# Patient Record
Sex: FEMALE | Race: BLACK OR AFRICAN AMERICAN | NOT HISPANIC OR LATINO | Employment: FULL TIME | URBAN - METROPOLITAN AREA
[De-identification: names, ages, dates, MRNs, and addresses within clinical notes are randomized per-mention and may not be internally consistent; named-entity substitution may affect disease eponyms.]

---

## 2023-08-18 LAB — URINE CULTURE: NORMAL

## 2024-03-25 ENCOUNTER — OFFICE VISIT (OUTPATIENT)
Dept: URGENT CARE | Facility: CLINIC | Age: 44
End: 2024-03-25
Payer: COMMERCIAL

## 2024-03-25 VITALS
OXYGEN SATURATION: 96 % | DIASTOLIC BLOOD PRESSURE: 68 MMHG | HEART RATE: 97 BPM | SYSTOLIC BLOOD PRESSURE: 99 MMHG | TEMPERATURE: 98.4 F | RESPIRATION RATE: 20 BRPM | BODY MASS INDEX: 36.02 KG/M2 | WEIGHT: 230 LBS

## 2024-03-25 DIAGNOSIS — R35.0 URINARY FREQUENCY: Primary | ICD-10-CM

## 2024-03-25 DIAGNOSIS — N39.0 ACUTE UTI: ICD-10-CM

## 2024-03-25 LAB
POC APPEARANCE, URINE: ABNORMAL
POC BILIRUBIN, URINE: ABNORMAL
POC BLOOD, URINE: NEGATIVE
POC COLOR, URINE: YELLOW
POC GLUCOSE, URINE: NEGATIVE MG/DL
POC KETONES, URINE: ABNORMAL MG/DL
POC LEUKOCYTES, URINE: NEGATIVE
POC NITRITE,URINE: POSITIVE
POC PH, URINE: 5.5 PH
POC PROTEIN, URINE: ABNORMAL MG/DL
POC SPECIFIC GRAVITY, URINE: >=1.03
POC UROBILINOGEN, URINE: 0.2 EU/DL

## 2024-03-25 PROCEDURE — 87086 URINE CULTURE/COLONY COUNT: CPT

## 2024-03-25 PROCEDURE — 99203 OFFICE O/P NEW LOW 30 MIN: CPT | Performed by: PHYSICIAN ASSISTANT

## 2024-03-25 PROCEDURE — 1036F TOBACCO NON-USER: CPT | Performed by: PHYSICIAN ASSISTANT

## 2024-03-25 PROCEDURE — 87186 SC STD MICRODIL/AGAR DIL: CPT

## 2024-03-25 PROCEDURE — 81002 URINALYSIS NONAUTO W/O SCOPE: CPT | Performed by: PHYSICIAN ASSISTANT

## 2024-03-25 RX ORDER — CIPROFLOXACIN 500 MG/1
500 TABLET ORAL 2 TIMES DAILY
Qty: 14 TABLET | Refills: 0 | Status: SHIPPED | OUTPATIENT
Start: 2024-03-25 | End: 2024-04-01

## 2024-03-25 RX ORDER — VALACYCLOVIR HYDROCHLORIDE 1 G/1
1000 TABLET, FILM COATED ORAL DAILY
COMMUNITY

## 2024-03-25 ASSESSMENT — ENCOUNTER SYMPTOMS
FREQUENCY: 1
DYSURIA: 1

## 2024-03-25 NOTE — PROGRESS NOTES
Subjective   Patient ID: Fani Warner is a 43 y.o. female.    Patient is a 43-year-old female who complains of urinary frequency and mild dysuria that she has been experiencing for the past 1 day.  Patient denies fever, chills, flank pain, dysuria, nausea or other symptoms.  Patient does have a history of urinary tract infections and states that she has been prescribed Cipro for same in the past which has worked well.      UTI  The following portions of the chart were reviewed this encounter and updated as appropriate:       Review of Systems   Genitourinary:  Positive for dysuria and frequency.   All other systems reviewed and are negative.  Objective   Physical Exam  Vitals and nursing note reviewed.   Constitutional:       Appearance: Normal appearance. She is normal weight.   HENT:      Head: Normocephalic.      Mouth/Throat:      Mouth: Mucous membranes are moist.      Pharynx: Oropharynx is clear.   Eyes:      Extraocular Movements: Extraocular movements intact.      Conjunctiva/sclera: Conjunctivae normal.      Pupils: Pupils are equal, round, and reactive to light.   Cardiovascular:      Pulses: Normal pulses.   Pulmonary:      Effort: Pulmonary effort is normal.      Breath sounds: Normal breath sounds.   Abdominal:      General: Abdomen is flat.      Palpations: Abdomen is soft.   Skin:     General: Skin is warm and dry.      Capillary Refill: Capillary refill takes less than 2 seconds.   Neurological:      General: No focal deficit present.      Mental Status: She is alert and oriented to person, place, and time.   Psychiatric:         Mood and Affect: Mood normal.         Behavior: Behavior normal.         Thought Content: Thought content normal.         Judgment: Judgment normal.     Assessment/Plan   Physical exam findings as noted above.  Urinalysis shows nitrite and urine culture was ordered.  Patient was provided with a prescription for Cipro 500 mg and advised that results of the urine culture  be available in 2 days.  Patient was advised that she will be contacted if there is a need to change her medication based on the sensitivity report.  Patient verbalizes clear understanding of the above instructions.    CLINICAL IMPRESSION:  Acute UTI    Diagnoses and all orders for this visit:  Urinary frequency  -     POCT UA (nonautomated) manually resulted  Acute UTI  -     Urine Culture  -     ciprofloxacin (Cipro) 500 mg tablet; Take 1 tablet (500 mg) by mouth 2 times a day for 7 days.      Patient disposition: Home

## 2024-03-28 ENCOUNTER — TELEPHONE (OUTPATIENT)
Dept: URGENT CARE | Facility: CLINIC | Age: 44
End: 2024-03-28
Payer: COMMERCIAL

## 2024-03-28 DIAGNOSIS — N30.00 ACUTE CYSTITIS WITHOUT HEMATURIA: Primary | ICD-10-CM

## 2024-03-28 LAB — BACTERIA UR CULT: ABNORMAL

## 2024-03-28 RX ORDER — SULFAMETHOXAZOLE AND TRIMETHOPRIM 800; 160 MG/1; MG/1
1 TABLET ORAL 2 TIMES DAILY
Qty: 10 TABLET | Refills: 0 | Status: SHIPPED | OUTPATIENT
Start: 2024-03-28 | End: 2024-04-02

## 2024-03-28 NOTE — TELEPHONE ENCOUNTER
Pt notified of antibiotic resistance to the cipro rx she was given at time of visit. Rx changed to bactrim DS bid x 5 days. Advised to stop cipro rx. Pcp follow up if not improving or worsening this week.

## 2024-11-04 ENCOUNTER — HOSPITAL ENCOUNTER (EMERGENCY)
Facility: HOSPITAL | Age: 44
Discharge: HOME | End: 2024-11-04
Payer: COMMERCIAL

## 2024-11-04 VITALS
TEMPERATURE: 98.6 F | HEIGHT: 67 IN | SYSTOLIC BLOOD PRESSURE: 120 MMHG | OXYGEN SATURATION: 96 % | DIASTOLIC BLOOD PRESSURE: 82 MMHG | RESPIRATION RATE: 16 BRPM | BODY MASS INDEX: 35.64 KG/M2 | WEIGHT: 227.07 LBS | HEART RATE: 69 BPM

## 2024-11-04 DIAGNOSIS — R51.9 ACUTE NONINTRACTABLE HEADACHE, UNSPECIFIED HEADACHE TYPE: Primary | ICD-10-CM

## 2024-11-04 DIAGNOSIS — R30.0 DYSURIA: ICD-10-CM

## 2024-11-04 DIAGNOSIS — J01.10 ACUTE FRONTAL SINUSITIS, RECURRENCE NOT SPECIFIED: ICD-10-CM

## 2024-11-04 LAB
APPEARANCE UR: CLEAR
BILIRUB UR STRIP.AUTO-MCNC: NEGATIVE MG/DL
COLOR UR: NORMAL
GLUCOSE UR STRIP.AUTO-MCNC: NORMAL MG/DL
KETONES UR STRIP.AUTO-MCNC: NEGATIVE MG/DL
LEUKOCYTE ESTERASE UR QL STRIP.AUTO: NEGATIVE
NITRITE UR QL STRIP.AUTO: NEGATIVE
PH UR STRIP.AUTO: 7 [PH]
PROT UR STRIP.AUTO-MCNC: NEGATIVE MG/DL
RBC # UR STRIP.AUTO: NEGATIVE /UL
SP GR UR STRIP.AUTO: 1.02
UROBILINOGEN UR STRIP.AUTO-MCNC: NORMAL MG/DL

## 2024-11-04 PROCEDURE — 2500000004 HC RX 250 GENERAL PHARMACY W/ HCPCS (ALT 636 FOR OP/ED): Performed by: PHYSICIAN ASSISTANT

## 2024-11-04 PROCEDURE — 2500000001 HC RX 250 WO HCPCS SELF ADMINISTERED DRUGS (ALT 637 FOR MEDICARE OP): Performed by: PHYSICIAN ASSISTANT

## 2024-11-04 PROCEDURE — 96372 THER/PROPH/DIAG INJ SC/IM: CPT | Performed by: PHYSICIAN ASSISTANT

## 2024-11-04 PROCEDURE — 99283 EMERGENCY DEPT VISIT LOW MDM: CPT | Performed by: PHYSICIAN ASSISTANT

## 2024-11-04 PROCEDURE — 81003 URINALYSIS AUTO W/O SCOPE: CPT | Performed by: PHYSICIAN ASSISTANT

## 2024-11-04 PROCEDURE — 2500000001 HC RX 250 WO HCPCS SELF ADMINISTERED DRUGS (ALT 637 FOR MEDICARE OP)

## 2024-11-04 RX ORDER — PROCHLORPERAZINE MALEATE 10 MG
10 TABLET ORAL ONCE
Status: COMPLETED | OUTPATIENT
Start: 2024-11-04 | End: 2024-11-04

## 2024-11-04 RX ORDER — DIPHENHYDRAMINE HCL 25 MG
25 TABLET ORAL ONCE
Status: COMPLETED | OUTPATIENT
Start: 2024-11-04 | End: 2024-11-04

## 2024-11-04 RX ORDER — METHYLPREDNISOLONE 4 MG/1
TABLET ORAL
Qty: 21 TABLET | Refills: 0 | Status: SHIPPED | OUTPATIENT
Start: 2024-11-04 | End: 2024-11-11

## 2024-11-04 RX ORDER — NAPROXEN 500 MG/1
500 TABLET ORAL
Qty: 14 TABLET | Refills: 0 | Status: SHIPPED | OUTPATIENT
Start: 2024-11-04 | End: 2024-11-11

## 2024-11-04 RX ORDER — ACETAMINOPHEN 325 MG/1
650 TABLET ORAL EVERY 6 HOURS PRN
Qty: 30 TABLET | Refills: 0 | Status: SHIPPED | OUTPATIENT
Start: 2024-11-04 | End: 2024-11-14

## 2024-11-04 RX ORDER — DOXYCYCLINE 100 MG/1
100 CAPSULE ORAL 2 TIMES DAILY
Qty: 20 CAPSULE | Refills: 0 | Status: SHIPPED | OUTPATIENT
Start: 2024-11-04 | End: 2024-11-14

## 2024-11-04 RX ORDER — PROCHLORPERAZINE EDISYLATE 5 MG/ML
10 INJECTION INTRAMUSCULAR; INTRAVENOUS EVERY 6 HOURS PRN
Status: DISCONTINUED | OUTPATIENT
Start: 2024-11-04 | End: 2024-11-04 | Stop reason: ALTCHOICE

## 2024-11-04 RX ORDER — KETOROLAC TROMETHAMINE 30 MG/ML
30 INJECTION, SOLUTION INTRAMUSCULAR; INTRAVENOUS ONCE
Status: COMPLETED | OUTPATIENT
Start: 2024-11-04 | End: 2024-11-04

## 2024-11-04 ASSESSMENT — COLUMBIA-SUICIDE SEVERITY RATING SCALE - C-SSRS
6. HAVE YOU EVER DONE ANYTHING, STARTED TO DO ANYTHING, OR PREPARED TO DO ANYTHING TO END YOUR LIFE?: NO
2. HAVE YOU ACTUALLY HAD ANY THOUGHTS OF KILLING YOURSELF?: NO
1. IN THE PAST MONTH, HAVE YOU WISHED YOU WERE DEAD OR WISHED YOU COULD GO TO SLEEP AND NOT WAKE UP?: NO

## 2024-11-04 ASSESSMENT — PAIN DESCRIPTION - ONSET: ONSET: AWAKENED FROM SLEEP

## 2024-11-04 ASSESSMENT — PAIN - FUNCTIONAL ASSESSMENT: PAIN_FUNCTIONAL_ASSESSMENT: 0-10

## 2024-11-04 ASSESSMENT — PAIN SCALES - GENERAL
PAINLEVEL_OUTOF10: 10 - WORST POSSIBLE PAIN
PAINLEVEL_OUTOF10: 0 - NO PAIN

## 2024-11-04 ASSESSMENT — PAIN DESCRIPTION - FREQUENCY: FREQUENCY: CONSTANT/CONTINUOUS

## 2024-11-04 ASSESSMENT — PAIN DESCRIPTION - PAIN TYPE: TYPE: ACUTE PAIN

## 2024-11-04 ASSESSMENT — PAIN DESCRIPTION - DESCRIPTORS: DESCRIPTORS: ACHING

## 2024-11-04 ASSESSMENT — PAIN DESCRIPTION - LOCATION: LOCATION: HEAD

## 2024-11-04 NOTE — ED PROVIDER NOTES
HPI   Chief Complaint   Patient presents with    Headache     Sinus pressure for last two weeks and possible uti       44-year-old female presented emergency department with a chief complaint of urinary frequency urgency burning with urination additionally complains of sinus congestion.  Sinus congestion has been for 2 weeks.  Urinary symptoms been for the last several days.  She denies abdominal pain denies flank pain.  Denies fevers.  Vital signs within normal limit.  Tried nothing for relief.  No other complaint.              Patient History   Past Medical History:   Diagnosis Date    Abnormal uterine and vaginal bleeding, unspecified 10/10/2019    Vaginal bleeding, abnormal    Candidiasis, unspecified 01/10/2019    Yeast infection    Encounter for screening for infections with a predominantly sexual mode of transmission 10/10/2019    Screen for STD (sexually transmitted disease)    Encounter for screening for other disorder 01/10/2019    Screening for blood or protein in urine    Hematoma of broad ligament     Broad ligament hematoma    Irregular menstruation, unspecified 2016    Irregular menstrual cycle    Other abnormal and inconclusive findings on diagnostic imaging of breast 2019    Abnormal finding on breast imaging    Personal history of other diseases of the female genital tract 2016    History of menorrhagia    Personal history of other diseases of the female genital tract 2014    History of dysmenorrhea    Personal history of other specified conditions 2017    History of pelvic hematoma    Personal history of other specified conditions 2019    History of urinary frequency    Personal history of other specified conditions 2019    History of urinary urgency    Unspecified abdominal pain 2019    Abdominal pain, recurrent     Past Surgical History:   Procedure Laterality Date     SECTION, CLASSIC  2014     Section    OTHER SURGICAL  HISTORY  12/28/2016    Supracervical Hysterectomy Laparoscopic     No family history on file.  Social History     Tobacco Use    Smoking status: Never    Smokeless tobacco: Never   Substance Use Topics    Alcohol use: Not on file    Drug use: Not on file       Physical Exam   ED Triage Vitals [11/04/24 1443]   Temperature Heart Rate Respirations BP   37 °C (98.6 °F) 69 16 120/82      Pulse Ox Temp Source Heart Rate Source Patient Position   96 % Oral Monitor Sitting      BP Location FiO2 (%)     Right arm --       Physical Exam  Vitals and nursing note reviewed.   Constitutional:       Appearance: She is well-developed.   HENT:      Head: Normocephalic.   Cardiovascular:      Rate and Rhythm: Normal rate and regular rhythm.   Pulmonary:      Effort: Pulmonary effort is normal.      Breath sounds: Normal breath sounds.   Musculoskeletal:         General: Normal range of motion.      Cervical back: Normal range of motion.   Skin:     General: Skin is warm.   Neurological:      Mental Status: She is alert and oriented to person, place, and time.      Cranial Nerves: No cranial nerve deficit or dysarthria.      Sensory: No sensory deficit.      Motor: No weakness.   Psychiatric:         Mood and Affect: Mood normal.           ED Course & MDM   Diagnoses as of 11/04/24 1708   Acute nonintractable headache, unspecified headache type   Acute frontal sinusitis, recurrence not specified   Dysuria                 No data recorded     [place] Coma Scale Score: 15 (11/04/24 1456 : Swati Best RN)                           Medical Decision Making  I have seen and evaluated this patient.  Physician available for consultation.  Vital signs have been reviewed.  All laboratory and diagnostic imaging is reviewed by myself and interpreted by myself unless otherwise stated.  Additionally imaging is interpreted by radiologist.    Urinalysis is negative.  Symptoms most consistent with sinus infection.  Placed on antibiotic for  coverage of dysuric symptoms in addition to sinus symptoms.  Released from emergent standpoint stable condition with outpatient follow-up.    Labs Reviewed  URINALYSIS WITH REFLEX CULTURE AND MICROSCOPIC - Normal     Color, Urine                                         Appearance, Urine             Clear                  Specific Gravity, Urine       1.024                  pH, Urine                     7.0                    Protein, Urine                NEGATIVE                Glucose, Urine                Normal                 Blood, Urine                  NEGATIVE                Ketones, Urine                NEGATIVE                Bilirubin, Urine              NEGATIVE                Urobilinogen, Urine           Normal                 Nitrite, Urine                NEGATIVE                Leukocyte Esterase, Urine     NEGATIVE             URINALYSIS WITH REFLEX CULTURE AND MICROSCOPIC       Narrative: The following orders were created for panel order Urinalysis with Reflex Culture and Microscopic.                Procedure                               Abnormality         Status                                   ---------                               -----------         ------                                   Urinalysis with Reflex C...[[id number]]  Normal              Final result                             Extra Urine Gray Tube[[id number]]                                                                                     Please view results for these tests on the individual orders.  EXTRA URINE GRAY TUBE  No orders to display  Medications  diphenhydrAMINE (Sominex) tablet 25 mg (25 mg oral Given 11/4/24 1606)  ketorolac (Toradol) injection 30 mg (30 mg intramuscular Given 11/4/24 1606)   prochlorperazine (Compazine) tablet 10 mg (10 mg oral Given 11/4/24 1606)  Discharge Medication List as of 11/4/2024  5:08 PM    START taking these medications    acetaminophen (Tylenol) 325 mg tablet  Take 2 tablets (650  mg) by mouth every 6 hours if needed for mild pain (1 - 3) for up to 10 days., Starting Mon 11/4/2024, Until Thu 11/14/2024 at 2359, Normal    doxycycline (Vibramycin) 100 mg capsule  Take 1 capsule (100 mg) by mouth 2 times a day for 10 days. Take with at least 8 ounces (large glass) of water, do not lie down for 30 minutes after, Starting Mon 11/4/2024, Until u 11/14/2024, Normal    methylPREDNISolone (Medrol Dospak) 4 mg tablets  Follow schedule on package instructions, Normal    naproxen (Naprosyn) 500 mg tablet  Take 1 tablet (500 mg) by mouth 2 times daily (morning and late afternoon) for 7 days., Starting Mon 11/4/2024, Until Mon 11/11/2024, Normal                  Procedure  Procedures     Kwabena Fernandez PA-C  11/08/24 3345

## 2024-11-14 ENCOUNTER — APPOINTMENT (OUTPATIENT)
Dept: RADIOLOGY | Facility: HOSPITAL | Age: 44
End: 2024-11-14
Payer: COMMERCIAL

## 2024-11-14 ENCOUNTER — HOSPITAL ENCOUNTER (EMERGENCY)
Facility: HOSPITAL | Age: 44
Discharge: HOME | End: 2024-11-14
Payer: COMMERCIAL

## 2024-11-14 VITALS
RESPIRATION RATE: 16 BRPM | WEIGHT: 245.81 LBS | TEMPERATURE: 98.1 F | HEIGHT: 67 IN | SYSTOLIC BLOOD PRESSURE: 129 MMHG | OXYGEN SATURATION: 97 % | HEART RATE: 68 BPM | DIASTOLIC BLOOD PRESSURE: 74 MMHG | BODY MASS INDEX: 38.58 KG/M2

## 2024-11-14 DIAGNOSIS — M71.21 SYNOVIAL CYST OF RIGHT POPLITEAL SPACE: ICD-10-CM

## 2024-11-14 DIAGNOSIS — M77.31 CALCANEAL SPUR OF RIGHT FOOT: ICD-10-CM

## 2024-11-14 DIAGNOSIS — M79.604 PAIN OF RIGHT LOWER EXTREMITY: Primary | ICD-10-CM

## 2024-11-14 PROCEDURE — 93971 EXTREMITY STUDY: CPT

## 2024-11-14 PROCEDURE — 73610 X-RAY EXAM OF ANKLE: CPT | Mod: RIGHT SIDE | Performed by: RADIOLOGY

## 2024-11-14 PROCEDURE — 73610 X-RAY EXAM OF ANKLE: CPT | Mod: RT

## 2024-11-14 PROCEDURE — 99284 EMERGENCY DEPT VISIT MOD MDM: CPT | Mod: 25

## 2024-11-14 PROCEDURE — 93971 EXTREMITY STUDY: CPT | Performed by: STUDENT IN AN ORGANIZED HEALTH CARE EDUCATION/TRAINING PROGRAM

## 2024-11-14 RX ORDER — KETOROLAC TROMETHAMINE 10 MG/1
10 TABLET, FILM COATED ORAL EVERY 8 HOURS PRN
Qty: 20 TABLET | Refills: 0 | Status: SHIPPED | OUTPATIENT
Start: 2024-11-14

## 2024-11-14 ASSESSMENT — COLUMBIA-SUICIDE SEVERITY RATING SCALE - C-SSRS
1. IN THE PAST MONTH, HAVE YOU WISHED YOU WERE DEAD OR WISHED YOU COULD GO TO SLEEP AND NOT WAKE UP?: NO
2. HAVE YOU ACTUALLY HAD ANY THOUGHTS OF KILLING YOURSELF?: NO
6. HAVE YOU EVER DONE ANYTHING, STARTED TO DO ANYTHING, OR PREPARED TO DO ANYTHING TO END YOUR LIFE?: NO

## 2024-11-14 ASSESSMENT — PAIN SCALES - GENERAL: PAINLEVEL_OUTOF10: 8

## 2024-11-14 ASSESSMENT — PAIN - FUNCTIONAL ASSESSMENT: PAIN_FUNCTIONAL_ASSESSMENT: 0-10

## 2024-11-14 NOTE — ED PROVIDER NOTES
HPI   Chief Complaint   Patient presents with    Foot Pain     Pt complains of of pain in her right heel that has been getting worse for a couple months.  States pain is now radiating up the back of her leg and causing pain in her knee.        HPI  See my MDM      Patient History   Past Medical History:   Diagnosis Date    Abnormal uterine and vaginal bleeding, unspecified 10/10/2019    Vaginal bleeding, abnormal    Candidiasis, unspecified 01/10/2019    Yeast infection    Encounter for screening for infections with a predominantly sexual mode of transmission 10/10/2019    Screen for STD (sexually transmitted disease)    Encounter for screening for other disorder 01/10/2019    Screening for blood or protein in urine    Hematoma of broad ligament     Broad ligament hematoma    Irregular menstruation, unspecified 2016    Irregular menstrual cycle    Other abnormal and inconclusive findings on diagnostic imaging of breast 2019    Abnormal finding on breast imaging    Personal history of other diseases of the female genital tract 2016    History of menorrhagia    Personal history of other diseases of the female genital tract 2014    History of dysmenorrhea    Personal history of other specified conditions 2017    History of pelvic hematoma    Personal history of other specified conditions 2019    History of urinary frequency    Personal history of other specified conditions 2019    History of urinary urgency    Unspecified abdominal pain 2019    Abdominal pain, recurrent     Past Surgical History:   Procedure Laterality Date     SECTION, CLASSIC  2014     Section    OTHER SURGICAL HISTORY  2016    Supracervical Hysterectomy Laparoscopic     No family history on file.  Social History     Tobacco Use    Smoking status: Never    Smokeless tobacco: Never   Substance Use Topics    Alcohol use: Not on file    Drug use: Not on file       Physical Exam    ED Triage Vitals [11/14/24 1158]   Temperature Heart Rate Respirations BP   36.7 °C (98.1 °F) 68 16 129/74      Pulse Ox Temp src Heart Rate Source Patient Position   97 % -- -- --      BP Location FiO2 (%)     -- --       Physical Exam  CONSTITUTIONAL: Vital signs reviewed as charted, well-developed and in no distress  Eyes: Extraocular muscles are intact. Pupils equal round and reactive to light. Conjunctiva are pink.    ENT: Mucous membranes are moist. Tongue in the midline. Pharynx was without erythema or exudates, uvula midline  LUNGS: Breath sounds equal and clear to auscultation. Good air exchange, no wheezes rales or retractions, pulse oximetry is charted.  HEART: Regular rate and rhythm without murmur thrill or rub, strong tones, auscultation is normal.  ABDOMEN: Soft and nontender without guarding rebound rigidity or mass. Bowel sounds are present and normal in all quadrants. There is no palpable masses or aneurysms identified. No hepatosplenomegaly, normal abdominal exam.  Neuro: The patient is awake, alert and oriented ×3. Moving all 4 extremities and answering questions appropriately.   MUSCULOSKELETAL: Exam the right lower extremity shows mild edema surrounding the Achilles tendon, overlying the fifth metatarsal.  Tenderness palpation of the entire back of the leg.  Motor sensation pulses are intact distally.  There is no laxity or divots noted in palpation.  Cap refill less than 2 seconds.  PSYCH: Awake alert oriented, normal mood and affect.  Skin:  Dry, normal color, warm to the touch, no rash present.        ED Course & MDM   Diagnoses as of 11/14/24 1328   Pain of right lower extremity   Synovial cyst of right popliteal space   Calcaneal spur of right foot                 No data recorded     [place] Coma Scale Score: 15 (11/14/24 1200 : Britni Garland RN)                           Medical Decision Making  History obtained from: patient    Vital signs, nursing notes, current medications, past  medical history, Surgical history, allergies, social history, family History were reviewed.         HPI:  Patient 44-year-old female presenting emergency room today for evaluation of right leg pain into the foot.  States it all started to heal several months ago and is now progressively worsened over the last couple of days been having pain up the back of the leg to the knee.  Denies trauma.  States she does work a job where she is on her feet a lot.  Thinks may be progressing.  No fevers chills or night sweats.  No nausea vomiting diarrhea.  Denies dizziness, chest pain, shortness of breath or abdominal pain.      10 point ROS was reviewed and negative except Noted above in HPI.  DDX: as listed above          MDM Summary/considerations:  Labs Reviewed - No data to display  Lower extremity venous duplex right   Final Result   No DVT of the right lower extremity.        Popliteal cyst, 3.5 x 0.6 x 2.6 cm.        MACRO:   None        Signed by: Deonte Rojas 11/14/2024 1:11 PM   Dictation workstation:   [id number]      XR ankle right 3+ views   Final Result   No evidence of fracture. Hindfoot spurring noted.        MACRO:   None.        Signed by: Argelia Cueva 11/14/2024 12:55 PM   Dictation workstation:   [id number]        Medications - No data to display  Discharge Medication List as of 11/14/2024  1:23 PM        I estimate there is LOW risk for COMPARTMENT SYNDROME, DEEP VENOUS THROMBOSIS, SEPTIC ARTHRITIS, TENDON OR NEUROVASCULAR INJURY, thus I consider the discharge disposition reasonable. We have discussed the diagnosis and risks, and we agree with discharging home to follow-up with their primary doctor or the referral orthopedist. We also discussed returning to the Emergency Department immediately if new or worsening symptoms occur. We have discussed the symptoms which aremost concerning (e.g., changing or worsening pain, numbness, weakness) that necessitates immediate return.    Ultrasound shows no  evidence of embolism but does show a Baker's cyst posterior aspect of the knee.  X-ray does show some calcaneal spurring but otherwise unremarkable.  Discussed symptomatic treatment including anti-inflammatories, rest ice compression elevation.  Was given orthopedic referral.  Was discharged home stable condition.    All of the patient's questions were answered to the best of my ability.  Patient states understanding that they have been screened for an emergency today and we have not found any etiology of symptoms that requires emergent treatment or admission to the hospital at this point. They understand that they have not had definitive care day and require follow-up for treatment of their condition. They also state understanding that they may have an emergent condition that may potentially have not of detected at this visit and they must return to the emergency department if they develop any worsening of symptoms or new complaints.      I have evaluated this patient, my supervising physician was available for consultation.              Critical Care: Not warranted at this time        This chart was completed using voice recognition transcription software. Please excuse any errors of transcription including grammatical, punctuation, syntax and spelling errors.  Please contact me with any questions regarding this chart.    Procedure  Procedures     eMng [place], [id number]  11/14/24 0728

## 2024-12-18 ENCOUNTER — OFFICE VISIT (OUTPATIENT)
Dept: URGENT CARE | Age: 44
End: 2024-12-18
Payer: COMMERCIAL

## 2024-12-18 VITALS
WEIGHT: 240 LBS | TEMPERATURE: 98.3 F | HEIGHT: 67 IN | BODY MASS INDEX: 37.67 KG/M2 | DIASTOLIC BLOOD PRESSURE: 66 MMHG | HEART RATE: 75 BPM | OXYGEN SATURATION: 99 % | SYSTOLIC BLOOD PRESSURE: 111 MMHG

## 2024-12-18 DIAGNOSIS — N30.01 ACUTE CYSTITIS WITH HEMATURIA: Primary | ICD-10-CM

## 2024-12-18 DIAGNOSIS — R39.15 URGENCY OF URINATION: ICD-10-CM

## 2024-12-18 LAB
POC APPEARANCE, URINE: ABNORMAL
POC BILIRUBIN, URINE: NEGATIVE
POC BLOOD, URINE: ABNORMAL
POC COLOR, URINE: YELLOW
POC GLUCOSE, URINE: NEGATIVE MG/DL
POC KETONES, URINE: NEGATIVE MG/DL
POC LEUKOCYTES, URINE: ABNORMAL
POC NITRITE,URINE: NEGATIVE
POC PH, URINE: 6.5 PH
POC PROTEIN, URINE: NEGATIVE MG/DL
POC SPECIFIC GRAVITY, URINE: 1.01
POC UROBILINOGEN, URINE: 0.2 EU/DL

## 2024-12-18 PROCEDURE — 3008F BODY MASS INDEX DOCD: CPT | Performed by: NURSE PRACTITIONER

## 2024-12-18 PROCEDURE — 99213 OFFICE O/P EST LOW 20 MIN: CPT | Performed by: NURSE PRACTITIONER

## 2024-12-18 PROCEDURE — 87186 SC STD MICRODIL/AGAR DIL: CPT

## 2024-12-18 PROCEDURE — 87086 URINE CULTURE/COLONY COUNT: CPT

## 2024-12-18 PROCEDURE — 81003 URINALYSIS AUTO W/O SCOPE: CPT | Performed by: NURSE PRACTITIONER

## 2024-12-18 RX ORDER — NITROFURANTOIN 25; 75 MG/1; MG/1
100 CAPSULE ORAL 2 TIMES DAILY
Qty: 10 CAPSULE | Refills: 0 | Status: SHIPPED | OUTPATIENT
Start: 2024-12-18 | End: 2024-12-23

## 2024-12-18 ASSESSMENT — ENCOUNTER SYMPTOMS
NAUSEA: 0
DIFFICULTY URINATING: 0
EYE PAIN: 0
MYALGIAS: 0
ABDOMINAL PAIN: 0
SINUS PRESSURE: 0
SHORTNESS OF BREATH: 0
CHILLS: 0
VOMITING: 0
FREQUENCY: 1
DYSURIA: 1
APPETITE CHANGE: 0
FATIGUE: 0
SINUS PAIN: 0
HEADACHES: 0
CONSTIPATION: 0
SORE THROAT: 0
ARTHRALGIAS: 0
BACK PAIN: 0
COUGH: 0
TROUBLE SWALLOWING: 0
DIARRHEA: 0
DIZZINESS: 0
NECK PAIN: 0
FLANK PAIN: 0
FEVER: 0
HEMATURIA: 0
CHEST TIGHTNESS: 0
WOUND: 0
PALPITATIONS: 0
RHINORRHEA: 0

## 2024-12-18 ASSESSMENT — PAIN SCALES - GENERAL: PAINLEVEL_OUTOF10: 0-NO PAIN

## 2024-12-18 ASSESSMENT — VISUAL ACUITY: OU: 1

## 2024-12-18 NOTE — PROGRESS NOTES
Subjective   Patient ID: Fani Warner is a 44 y.o. female. They present today with a chief complaint of UTI (Patient c/o urgency, and frequency ).    History of Present Illness  The patient is a 43yo female who presents with urinary frequency, urgency, and pain x 2 days. The patient states that her last UTI was last month.      UTI  Associated symptoms: no abdominal pain, no chest pain, no congestion, no cough, no diarrhea, no ear pain, no fatigue, no fever, no headaches, no myalgias, no nausea, no rash, no rhinorrhea, no shortness of breath, no sore throat and no vomiting        Past Medical History  Allergies as of 12/18/2024 - Reviewed 12/18/2024   Allergen Reaction Noted    Penicillins Hives and Rash 09/30/2010       (Not in a hospital admission)       Past Medical History:   Diagnosis Date    Abnormal uterine and vaginal bleeding, unspecified 10/10/2019    Vaginal bleeding, abnormal    Candidiasis, unspecified 01/10/2019    Yeast infection    Encounter for screening for infections with a predominantly sexual mode of transmission 10/10/2019    Screen for STD (sexually transmitted disease)    Encounter for screening for other disorder 01/10/2019    Screening for blood or protein in urine    Hematoma of broad ligament     Broad ligament hematoma    Irregular menstruation, unspecified 09/30/2016    Irregular menstrual cycle    Other abnormal and inconclusive findings on diagnostic imaging of breast 04/12/2019    Abnormal finding on breast imaging    Personal history of other diseases of the female genital tract 12/06/2016    History of menorrhagia    Personal history of other diseases of the female genital tract 08/25/2014    History of dysmenorrhea    Personal history of other specified conditions 03/09/2017    History of pelvic hematoma    Personal history of other specified conditions 07/01/2019    History of urinary frequency    Personal history of other specified conditions 02/07/2019    History of urinary  "urgency    Unspecified abdominal pain 2019    Abdominal pain, recurrent       Past Surgical History:   Procedure Laterality Date     SECTION, CLASSIC  2014     Section    OTHER SURGICAL HISTORY  2016    Supracervical Hysterectomy Laparoscopic        reports that she has never smoked. She has never used smokeless tobacco.    Review of Systems  Review of Systems   Constitutional:  Negative for appetite change, chills, fatigue and fever.   HENT:  Negative for congestion, dental problem, ear pain, hearing loss, postnasal drip, rhinorrhea, sinus pressure, sinus pain, sneezing, sore throat and trouble swallowing.    Eyes:  Negative for pain.   Respiratory:  Negative for cough, chest tightness and shortness of breath.    Cardiovascular:  Negative for chest pain and palpitations.   Gastrointestinal:  Negative for abdominal pain, constipation, diarrhea, nausea and vomiting.   Genitourinary:  Positive for dysuria, frequency and urgency. Negative for decreased urine volume, difficulty urinating, enuresis, flank pain, genital sores, hematuria, menstrual problem, pelvic pain, vaginal bleeding, vaginal discharge and vaginal pain.   Musculoskeletal:  Negative for arthralgias, back pain, gait problem, myalgias and neck pain.   Skin:  Negative for rash and wound.   Neurological:  Negative for dizziness and headaches.   Psychiatric/Behavioral:  Negative for self-injury and suicidal ideas.                                   Objective    Vitals:    24 1419   BP: 111/66   Pulse: 75   Temp: 36.8 °C (98.3 °F)   SpO2: 99%   Weight: 109 kg (240 lb)   Height: 1.702 m (5' 7\")     No LMP recorded.    Physical Exam  Vitals reviewed.   Constitutional:       General: She is awake. She is not in acute distress.     Appearance: Normal appearance. She is well-developed, well-groomed and normal weight. She is not ill-appearing.   HENT:      Head: Normocephalic and atraumatic.      Right Ear: Hearing and external " ear normal.      Left Ear: Hearing and external ear normal.      Nose: Nose normal. No nasal deformity or signs of injury.      Mouth/Throat:      Lips: Pink.   Eyes:      General: Lids are normal. Vision grossly intact.   Cardiovascular:      Rate and Rhythm: Normal rate and regular rhythm.      Heart sounds: Normal heart sounds, S1 normal and S2 normal. Heart sounds not distant. No murmur heard.  Pulmonary:      Effort: Pulmonary effort is normal. No tachypnea, bradypnea, accessory muscle usage, prolonged expiration, respiratory distress or retractions.      Breath sounds: Normal breath sounds and air entry. No stridor, decreased air movement or transmitted upper airway sounds. No decreased breath sounds.   Chest:      Chest wall: No deformity.   Abdominal:      General: Abdomen is flat. Bowel sounds are normal.      Palpations: Abdomen is soft.      Tenderness: There is no right CVA tenderness or left CVA tenderness.   Skin:     General: Skin is warm and dry.      Capillary Refill: Capillary refill takes less than 2 seconds.   Neurological:      General: No focal deficit present.      Mental Status: She is alert.      Gait: Gait is intact.   Psychiatric:         Attention and Perception: Attention and perception normal.         Mood and Affect: Mood and affect normal.         Speech: Speech normal.         Behavior: Behavior normal. Behavior is cooperative.         Procedures    Point of Care Test & Imaging Results from this visit  Results for orders placed or performed in visit on 12/18/24   POCT UA Automated manually resulted   Result Value Ref Range    POC Color, Urine Yellow Straw, Yellow, Light-Yellow    POC Appearance, Urine Cloudy (A) Clear    POC Glucose, Urine NEGATIVE NEGATIVE mg/dl    POC Bilirubin, Urine NEGATIVE NEGATIVE    POC Ketones, Urine NEGATIVE NEGATIVE mg/dl    POC Specific Gravity, Urine 1.015 1.005 - 1.035    POC Blood, Urine TRACE-Intact (A) NEGATIVE    POC PH, Urine 6.5 No Reference Range  Established PH    POC Protein, Urine NEGATIVE NEGATIVE, 30 (1+) mg/dl    POC Urobilinogen, Urine 0.2 0.2, 1.0 EU/DL    Poc Nitrite, Urine NEGATIVE NEGATIVE    POC Leukocytes, Urine MODERATE (2+) (A) NEGATIVE      No results found.    Diagnostic study results (if any) were reviewed by HAMILTON Lawrence.    Assessment/Plan   Allergies, medications, history, and pertinent labs/EKGs/Imaging reviewed by HAMILTON Lawrence.     Medical Decision Making  Urinalysis indicative of a UTI. Will treat with macrobid bid x 5 day. Advised patient to increase clear fluid intake. Continue with probiotic. Follow up with pcp or urology for reoccurrence.     Risks, benefits, and alternatives of the medications and treatment plan prescribed today were discussed, and patient expressed understanding. Plan follow up as discussed or as needed if any worsening symptoms or change in condition. Reinforced red flags including (but not limited to): severe or worsening abdominal pain; difficulty swallowing; stiff neck; shortness of breath; coughing or vomiting blood; chest pain; and new or increased fever are indications to go to the Emergency Department.    The patient voices understanding of all medications. No barriers to adherence. Patient is taking all medications as prescribed and tolerating well. For any new medications, the patient was instructed of directions for and consequences of not taking medication and they were informed about the potential side effects and drug interactions. The after-visit summary was given to the patient and care instructions were reviewed with the patient. All questions were answered and the patient verbalized understanding of the plan of care for today.    Orders and Diagnoses  Diagnoses and all orders for this visit:  Acute cystitis with hematuria  -     nitrofurantoin, macrocrystal-monohydrate, (Macrobid) 100 mg capsule; Take 1 capsule (100 mg) by mouth 2 times a day for 5  days.  Urgency of urination  -     POCT UA Automated manually resulted  -     Urine Culture      Medical Admin Record      Patient disposition: Home    Electronically signed by HAMILTON Lawrence  2:30 PM

## 2024-12-21 LAB — BACTERIA UR CULT: ABNORMAL

## 2024-12-22 ENCOUNTER — TELEPHONE (OUTPATIENT)
Dept: URGENT CARE | Age: 44
End: 2024-12-22

## 2024-12-22 DIAGNOSIS — N30.00 ACUTE CYSTITIS WITHOUT HEMATURIA: Primary | ICD-10-CM

## 2024-12-22 RX ORDER — SULFAMETHOXAZOLE AND TRIMETHOPRIM 800; 160 MG/1; MG/1
1 TABLET ORAL 2 TIMES DAILY
Qty: 10 TABLET | Refills: 0 | Status: SHIPPED | OUTPATIENT
Start: 2024-12-22 | End: 2024-12-27

## 2024-12-22 NOTE — TELEPHONE ENCOUNTER
Pt urine culture showed resistance to macrobid. Called pt and left VM to call RUBENS garcia. Order for bactrim sent to pharmacy.

## 2024-12-26 ENCOUNTER — HOSPITAL ENCOUNTER (OUTPATIENT)
Dept: RADIOLOGY | Facility: HOSPITAL | Age: 44
Discharge: HOME | End: 2024-12-26
Payer: COMMERCIAL

## 2024-12-26 DIAGNOSIS — S83.271A COMPLEX TEAR OF LATERAL MENISCUS, CURRENT INJURY, RIGHT KNEE, INITIAL ENCOUNTER: ICD-10-CM

## 2024-12-26 PROCEDURE — 73721 MRI JNT OF LWR EXTRE W/O DYE: CPT | Mod: RIGHT SIDE | Performed by: RADIOLOGY

## 2024-12-26 PROCEDURE — 73721 MRI JNT OF LWR EXTRE W/O DYE: CPT | Mod: RT

## 2025-06-22 ENCOUNTER — OFFICE VISIT (OUTPATIENT)
Dept: URGENT CARE | Age: 45
End: 2025-06-22
Payer: COMMERCIAL

## 2025-06-22 VITALS
WEIGHT: 249 LBS | TEMPERATURE: 98 F | SYSTOLIC BLOOD PRESSURE: 109 MMHG | HEART RATE: 82 BPM | BODY MASS INDEX: 39.08 KG/M2 | HEIGHT: 67 IN | DIASTOLIC BLOOD PRESSURE: 75 MMHG | RESPIRATION RATE: 20 BRPM | OXYGEN SATURATION: 96 %

## 2025-06-22 DIAGNOSIS — R30.0 BURNING WITH URINATION: ICD-10-CM

## 2025-06-22 DIAGNOSIS — N30.01 ACUTE CYSTITIS WITH HEMATURIA: Primary | ICD-10-CM

## 2025-06-22 LAB
POC APPEARANCE, URINE: ABNORMAL
POC BILIRUBIN, URINE: NEGATIVE
POC BLOOD, URINE: NEGATIVE
POC COLOR, URINE: YELLOW
POC GLUCOSE, URINE: NEGATIVE MG/DL
POC KETONES, URINE: NEGATIVE MG/DL
POC LEUKOCYTES, URINE: ABNORMAL
POC NITRITE,URINE: POSITIVE
POC PH, URINE: 6 PH
POC PROTEIN, URINE: NEGATIVE MG/DL
POC SPECIFIC GRAVITY, URINE: 1.01
POC UROBILINOGEN, URINE: 0.2 EU/DL
PREGNANCY TEST URINE, POC: NEGATIVE

## 2025-06-22 RX ORDER — SULFAMETHOXAZOLE AND TRIMETHOPRIM 800; 160 MG/1; MG/1
1 TABLET ORAL 2 TIMES DAILY
Qty: 14 TABLET | Refills: 0 | Status: SHIPPED | OUTPATIENT
Start: 2025-06-22

## 2025-06-22 RX ORDER — PHENAZOPYRIDINE HYDROCHLORIDE 100 MG/1
100 TABLET, FILM COATED ORAL 3 TIMES DAILY PRN
Qty: 10 TABLET | Refills: 0 | Status: SHIPPED | OUTPATIENT
Start: 2025-06-22 | End: 2025-06-25

## 2025-06-22 ASSESSMENT — ENCOUNTER SYMPTOMS
ABDOMINAL PAIN: 1
FATIGUE: 0
FEVER: 0
CHILLS: 0
FREQUENCY: 1
DYSURIA: 1
VOMITING: 0
FLANK PAIN: 1
NAUSEA: 1

## 2025-06-22 NOTE — PROGRESS NOTES
"Subjective   Patient ID: Fani Warner is a 44 y.o. female. They present today with a chief complaint of Urinary Problem (Frequency, burning with urination x 1 week.).    History of Present Illness  See mdm      History provided by:  Patient      Past Medical History  Allergies as of 06/22/2025 - Reviewed 06/22/2025   Allergen Reaction Noted    Penicillins Hives, Rash, and Other 09/30/2010       Prescriptions Prior to Admission[1]     Medical History[2]    Surgical History[3]     reports that she has never smoked. She has never used smokeless tobacco.    Review of Systems  Review of Systems   Constitutional:  Negative for chills, fatigue and fever.   Gastrointestinal:  Positive for abdominal pain and nausea. Negative for vomiting.   Genitourinary:  Positive for dysuria, flank pain, frequency and urgency.   All other systems reviewed and are negative.                                 Objective    Vitals:    06/22/25 1924   BP: 109/75   Pulse: 82   Resp: 20   Temp: 36.7 °C (98 °F)   TempSrc: Temporal   SpO2: 96%   Weight: 113 kg (249 lb)   Height: 1.702 m (5' 7\")     No LMP recorded.    Physical Exam  Vitals and nursing note reviewed.   Cardiovascular:      Rate and Rhythm: Normal rate and regular rhythm.   Pulmonary:      Effort: Pulmonary effort is normal.      Breath sounds: Normal breath sounds.   Abdominal:      General: Abdomen is flat. Bowel sounds are normal.      Palpations: Abdomen is soft.      Tenderness: There is no abdominal tenderness.   Neurological:      Mental Status: She is alert and oriented to person, place, and time.   Psychiatric:         Behavior: Behavior is cooperative.         Procedures    Point of Care Test & Imaging Results from this visit  Results for orders placed or performed in visit on 06/22/25   POCT UA Automated manually resulted   Result Value Ref Range    POC Color, Urine Yellow Straw, Yellow, Light-Yellow    POC Appearance, Urine Hazy (A) Clear    POC Glucose, Urine NEGATIVE " NEGATIVE mg/dl    POC Bilirubin, Urine NEGATIVE NEGATIVE    POC Ketones, Urine NEGATIVE NEGATIVE mg/dl    POC Specific Gravity, Urine 1.015 1.005 - 1.035    POC Blood, Urine NEGATIVE NEGATIVE    POC PH, Urine 6.0 No Reference Range Established PH    POC Protein, Urine NEGATIVE NEGATIVE mg/dl    POC Urobilinogen, Urine 0.2 0.2, 1.0 EU/DL    Poc Nitrite, Urine POSITIVE (A) NEGATIVE    POC Leukocytes, Urine MODERATE (2+) (A) NEGATIVE   POCT pregnancy, urine manually resulted   Result Value Ref Range    Preg Test, Ur Negative Negative      Imaging  No results found.    Cardiology, Vascular, and Other Imaging  No other imaging results found for the past 2 days      Diagnostic study results (if any) were reviewed by [facility].    Assessment/Plan   Allergies, medications, history, and pertinent labs/EKGs/Imaging reviewed by Crystal L Severino, APRN-CNP.     Medical Decision Making  44-year-old female presents with chief complaint of urinary frequency, burning with urination x 1 week.  She states she is having mild lower abdominal pain but she has been taking Azo so it has helped, she does have bilateral flank pain and some nausea.  She denies any fevers or chills, vomiting or diarrhea.  Urine is obtained and is positive for hematuria, nitrates and leukocytes.  Patient will be started on Bactrim and Pyridium.  At time of discharge patient was clinically well-appearing and HDS for outpatient management. The patient and/or family was educated regarding diagnosis, supportive care, OTC and Rx medications. The patient and/or family was given the opportunity to ask questions prior to discharge.  They verbalized understanding of my discussion of the plans for treatment, expected course, indications to return to  or seek further evaluation in ED, and the need for timely follow up as directed.   They were provided with a work/school excuse if requested.      Orders and Diagnoses  Diagnoses and all orders for  this visit:  Acute cystitis with hematuria  -     sulfamethoxazole-trimethoprim (Bactrim DS) 800-160 mg tablet; Take 1 tablet by mouth 2 times a day.  -     phenazopyridine (Pyridium) 100 mg tablet; Take 1 tablet (100 mg) by mouth 3 times a day as needed for bladder spasms for up to 3 days.  Burning with urination  -     POCT UA Automated manually resulted  -     POCT pregnancy, urine manually resulted  Tylenol/Ibuprofen  Increase fluid intake  If symptoms persist or worsen, follow up with your pcp      Medical Admin Record      Patient disposition: Home    Electronically signed by [facility]  7:40 PM           [1] (Not in a hospital admission)  [2]   Past Medical History:  Diagnosis Date    Abnormal uterine and vaginal bleeding, unspecified 10/10/2019    Vaginal bleeding, abnormal    Candidiasis, unspecified 01/10/2019    Yeast infection    Encounter for screening for infections with a predominantly sexual mode of transmission 10/10/2019    Screen for STD (sexually transmitted disease)    Encounter for screening for other disorder 01/10/2019    Screening for blood or protein in urine    Hematoma of broad ligament     Broad ligament hematoma    Irregular menstruation, unspecified 09/30/2016    Irregular menstrual cycle    Other abnormal and inconclusive findings on diagnostic imaging of breast 04/12/2019    Abnormal finding on breast imaging    Personal history of other diseases of the female genital tract 12/06/2016    History of menorrhagia    Personal history of other diseases of the female genital tract 08/25/2014    History of dysmenorrhea    Personal history of other specified conditions 03/09/2017    History of pelvic hematoma    Personal history of other specified conditions 07/01/2019    History of urinary frequency    Personal history of other specified conditions 02/07/2019    History of urinary urgency    Unspecified abdominal pain 02/07/2019    Abdominal pain, recurrent   [3]   Past  Surgical History:  Procedure Laterality Date     SECTION, CLASSIC  2014     Section    OTHER SURGICAL HISTORY  2016    Supracervical Hysterectomy Laparoscopic

## 2025-06-26 LAB — BACTERIA UR CULT: ABNORMAL

## 2025-08-05 ENCOUNTER — PATIENT OUTREACH (OUTPATIENT)
Dept: CARE COORDINATION | Facility: CLINIC | Age: 45
End: 2025-08-05
Payer: COMMERCIAL

## 2025-08-05 DIAGNOSIS — Z12.31 ENCOUNTER FOR SCREENING MAMMOGRAM FOR BREAST CANCER: ICD-10-CM

## 2025-08-06 ENCOUNTER — APPOINTMENT (OUTPATIENT)
Dept: RADIOLOGY | Facility: HOSPITAL | Age: 45
End: 2025-08-06
Payer: COMMERCIAL

## 2025-08-06 ENCOUNTER — HOSPITAL ENCOUNTER (EMERGENCY)
Facility: HOSPITAL | Age: 45
Discharge: HOME | End: 2025-08-06
Payer: COMMERCIAL

## 2025-08-06 VITALS
OXYGEN SATURATION: 98 % | SYSTOLIC BLOOD PRESSURE: 161 MMHG | BODY MASS INDEX: 39.76 KG/M2 | TEMPERATURE: 98.8 F | WEIGHT: 253.31 LBS | HEIGHT: 67 IN | HEART RATE: 62 BPM | RESPIRATION RATE: 18 BRPM | DIASTOLIC BLOOD PRESSURE: 98 MMHG

## 2025-08-06 DIAGNOSIS — I82.449: ICD-10-CM

## 2025-08-06 DIAGNOSIS — M25.561 ACUTE PAIN OF RIGHT KNEE: Primary | ICD-10-CM

## 2025-08-06 DIAGNOSIS — M71.21 SYNOVIAL CYST OF RIGHT POPLITEAL SPACE: ICD-10-CM

## 2025-08-06 LAB
ANION GAP SERPL CALCULATED.3IONS-SCNC: 9 MMOL/L (ref 10–20)
BASOPHILS # BLD AUTO: 0.08 X10*3/UL (ref 0–0.1)
BASOPHILS NFR BLD AUTO: 1.6 %
BUN SERPL-MCNC: 14 MG/DL (ref 6–23)
CALCIUM SERPL-MCNC: 8.9 MG/DL (ref 8.6–10.3)
CHLORIDE SERPL-SCNC: 105 MMOL/L (ref 98–107)
CO2 SERPL-SCNC: 27 MMOL/L (ref 21–32)
CREAT SERPL-MCNC: 0.63 MG/DL (ref 0.5–1.05)
EGFRCR SERPLBLD CKD-EPI 2021: >90 ML/MIN/1.73M*2
EOSINOPHIL # BLD AUTO: 0.16 X10*3/UL (ref 0–0.7)
EOSINOPHIL NFR BLD AUTO: 3.2 %
ERYTHROCYTE [DISTWIDTH] IN BLOOD BY AUTOMATED COUNT: 14 % (ref 11.5–14.5)
GLUCOSE SERPL-MCNC: 81 MG/DL (ref 74–99)
HCT VFR BLD AUTO: 41.8 % (ref 36–46)
HGB BLD-MCNC: 13.7 G/DL (ref 12–16)
IMM GRANULOCYTES # BLD AUTO: 0.01 X10*3/UL (ref 0–0.7)
IMM GRANULOCYTES NFR BLD AUTO: 0.2 % (ref 0–0.9)
LYMPHOCYTES # BLD AUTO: 1.47 X10*3/UL (ref 1.2–4.8)
LYMPHOCYTES NFR BLD AUTO: 29.6 %
MCH RBC QN AUTO: 27.8 PG (ref 26–34)
MCHC RBC AUTO-ENTMCNC: 32.8 G/DL (ref 32–36)
MCV RBC AUTO: 85 FL (ref 80–100)
MONOCYTES # BLD AUTO: 0.51 X10*3/UL (ref 0.1–1)
MONOCYTES NFR BLD AUTO: 10.3 %
NEUTROPHILS # BLD AUTO: 2.74 X10*3/UL (ref 1.2–7.7)
NEUTROPHILS NFR BLD AUTO: 55.1 %
NRBC BLD-RTO: 0 /100 WBCS (ref 0–0)
PLATELET # BLD AUTO: 351 X10*3/UL (ref 150–450)
POTASSIUM SERPL-SCNC: 3.9 MMOL/L (ref 3.5–5.3)
RBC # BLD AUTO: 4.92 X10*6/UL (ref 4–5.2)
SODIUM SERPL-SCNC: 137 MMOL/L (ref 136–145)
WBC # BLD AUTO: 5 X10*3/UL (ref 4.4–11.3)

## 2025-08-06 PROCEDURE — 93971 EXTREMITY STUDY: CPT | Mod: FOREIGN READ | Performed by: RADIOLOGY

## 2025-08-06 PROCEDURE — 80048 BASIC METABOLIC PNL TOTAL CA: CPT

## 2025-08-06 PROCEDURE — 85025 COMPLETE CBC W/AUTO DIFF WBC: CPT

## 2025-08-06 PROCEDURE — 93971 EXTREMITY STUDY: CPT

## 2025-08-06 PROCEDURE — 73564 X-RAY EXAM KNEE 4 OR MORE: CPT | Mod: RT

## 2025-08-06 PROCEDURE — 36415 COLL VENOUS BLD VENIPUNCTURE: CPT

## 2025-08-06 PROCEDURE — 2500000001 HC RX 250 WO HCPCS SELF ADMINISTERED DRUGS (ALT 637 FOR MEDICARE OP)

## 2025-08-06 PROCEDURE — 73564 X-RAY EXAM KNEE 4 OR MORE: CPT | Mod: RIGHT SIDE | Performed by: RADIOLOGY

## 2025-08-06 PROCEDURE — 99285 EMERGENCY DEPT VISIT HI MDM: CPT | Mod: 25

## 2025-08-06 RX ORDER — OXYCODONE AND ACETAMINOPHEN 5; 325 MG/1; MG/1
1 TABLET ORAL ONCE
Refills: 0 | Status: COMPLETED | OUTPATIENT
Start: 2025-08-06 | End: 2025-08-06

## 2025-08-06 RX ORDER — OXYCODONE AND ACETAMINOPHEN 5; 325 MG/1; MG/1
1 TABLET ORAL EVERY 6 HOURS PRN
Qty: 10 TABLET | Refills: 0 | Status: SHIPPED | OUTPATIENT
Start: 2025-08-06 | End: 2025-08-09

## 2025-08-06 RX ADMIN — OXYCODONE HYDROCHLORIDE AND ACETAMINOPHEN 1 TABLET: 5; 325 TABLET ORAL at 21:12

## 2025-08-06 RX ADMIN — APIXABAN 10 MG: 5 TABLET, FILM COATED ORAL at 21:12

## 2025-08-06 RX ADMIN — OXYCODONE HYDROCHLORIDE AND ACETAMINOPHEN 1 TABLET: 5; 325 TABLET ORAL at 17:45

## 2025-08-06 ASSESSMENT — PAIN DESCRIPTION - ORIENTATION: ORIENTATION: RIGHT

## 2025-08-06 ASSESSMENT — PAIN DESCRIPTION - FREQUENCY: FREQUENCY: CONSTANT/CONTINUOUS

## 2025-08-06 ASSESSMENT — PAIN SCALES - GENERAL: PAINLEVEL_OUTOF10: 7

## 2025-08-06 ASSESSMENT — PAIN DESCRIPTION - PROGRESSION: CLINICAL_PROGRESSION: NOT CHANGED

## 2025-08-06 ASSESSMENT — PAIN DESCRIPTION - ONSET: ONSET: SUDDEN

## 2025-08-06 ASSESSMENT — PAIN - FUNCTIONAL ASSESSMENT: PAIN_FUNCTIONAL_ASSESSMENT: 0-10

## 2025-08-06 ASSESSMENT — PAIN DESCRIPTION - DESCRIPTORS
DESCRIPTORS: SHARP;STABBING;HEAVINESS
DESCRIPTORS: SHARP;STABBING

## 2025-08-06 ASSESSMENT — PAIN DESCRIPTION - PAIN TYPE: TYPE: ACUTE PAIN

## 2025-08-06 ASSESSMENT — PAIN DESCRIPTION - LOCATION: LOCATION: KNEE

## 2025-08-06 NOTE — ED PROVIDER NOTES
HPI   Chief Complaint   Patient presents with    Knee Pain     I have arthritis baker cyst and torn meniscus for the past couple of days I have not been able to bend my rt knee and it sharp stabbing pain and feeling heavy         Patient is a 45-year-old male email female who presents emergency department for evaluation of right knee pain and calf pain.  Patient states that she is been dealing with some chronic pain in her knee and lower extremity for several months.  She states over the last month it significantly worsened.  She states that she was told she had a torn meniscus a Baker's cyst and arthritis in her right knee but has not followed up with orthopedics this year after an evaluation that occurred last year.  She has received shots in the right knee.  She notes over the last week she has had increasing pain and fullness in her right knee.  She is a dull aching pain in her right calf.  She has some pain with range of motion of the right knee and feels that it is so swollen that she is unable to bend it much.  She denies any erythema or warmth and denies any fevers or chills.  She denies any specific injury or trauma.  She states that the pain is worse with bearing weight and long periods of standing or walking.  She has been taking Aleve which is not significantly helped with the pain.  She denies any new onset numbness or tingling in the leg and denies any other concerns at this time.  Denies any chest pain, shortness of breath, lightheadedness, dizziness, nausea, vomiting.      History provided by:  Patient   used: No            Patient History   Medical History[1]  Surgical History[2]  Family History[3]  Social History[4]    Physical Exam   ED Triage Vitals [08/06/25 1603]   Temperature Heart Rate Respirations BP   37.1 °C (98.8 °F) 81 18 120/81      Pulse Ox Temp Source Heart Rate Source Patient Position   96 % Temporal Monitor Sitting      BP Location FiO2 (%)     Left arm --        Physical Exam  Constitutional:       Appearance: Normal appearance.     Cardiovascular:      Rate and Rhythm: Normal rate and regular rhythm.   Pulmonary:      Effort: Pulmonary effort is normal.      Breath sounds: Normal breath sounds.     Musculoskeletal:         General: Swelling and tenderness present.      Comments: Palpable popliteal swelling fossa swelling with no overlying erythema or warmth with minimal tenderness to palpation over circumferential right knee with some diminished flexion due to pain.  Right lower extremity with strong distal radial pulses with dorsiflexion and plantarflexion intact.  Right lower extremity with strong distal dorsalis pedis pulses.  No overlying erythema or warmth to calf with some swelling over posterior calf with palpation.     Skin:     General: Skin is warm and dry.     Neurological:      General: No focal deficit present.      Mental Status: She is alert and oriented to person, place, and time.           ED Course & MDM   Diagnoses as of 08/07/25 1153   Acute pain of right knee   Synovial cyst of right popliteal space   Deep vein thrombosis (DVT) of posterior tibial vein                 No data recorded     [place] Coma Scale Score: 15 (08/06/25 1612 : Nicky Terrazas RN)                           Medical Decision Making  Patient is a 45-year-old female presents emergency department for evaluation of right knee and calf pain worsening over the last week ongoing over several months.    Lab work done today included BMP, CBC.  Lab work without significant abnormality.    Scans done today were interpreted/confirmed by radiologist and also interpreted by me which included x-ray right knee and ultrasound venous duplex right lower extremity.  X-ray right knee shows no acute osseous abnormalities.  Ultrasound shows evidence for occlusive DVT in the right posterior tibial veins with right Baker's cyst.    Medications given at today's visit include PO Percocet, p.o.  Eliquis    I saw this patient independently.  Patient does have a palpable cyst to posterior right knee consistent with likely Baker's cyst.  Patient does have known history of arthritis in her right knee as well as meniscal tear noted on MRI done last December.  Given the swelling in lower extremity and significant pain ultrasound was also obtained to rule out DVT.  Ultrasound today does show occlusive DVT in the right posterior tibial veins as well as right Baker's cyst.  Patient's pain likely multifactorial related to both DVT and component of Baker's cyst and arthritis in the knee as well.  Given occlusive DVT basic labs were obtained which were at baseline and patient will be started on anticoagulation medication of Eliquis.  She was given starter pack of Eliquis and educated to follow-up closely with vascular medicine outpatient for continued management of new DVT.  She is able to ambulate in the emergency department out any difficulty but does have some persistent pain.  She will follow-up closely with orthopedic provider outpatient as well.  Will be given Percocet to help with more severe pain and educated on continued ice elevation to help with Baker's cyst and arthritis in the knee.  Ace wrap was applied by nursing staff under my direct supervision and patient neurovascularly intact following Ace wrap application.  Emergent pathologies were considered for this patient, although I have low suspicion for anything acutely emergent given patient's clinical presentation, history, physical exam, stable vital signs.  Discharging patient home is reasonable plan of care for outpatient management.    All labs, imaging, and diagnostic studies were reviewed by me and patient was counseled on clinical impression, expectations, and plan.  Patient was educated to follow-up with PCP in the following 1-2 days.  All questions from patient were answered. They elicited understanding and were agreeable to course of treatment.   Patient was discharged in stable condition and given strict return precautions.    Prescriptions given on discharge: P.o. Eliquis, Percocet    ** Disclaimer:  Parts of this document were written utilizing a voice to text dictation software.  Note may contain minor transcription or typographical errors that were inadvertently transcribed by the computer software.        Procedure  Procedures       [1]   Past Medical History:  Diagnosis Date    Abnormal uterine and vaginal bleeding, unspecified 10/10/2019    Vaginal bleeding, abnormal    Candidiasis, unspecified 01/10/2019    Yeast infection    Encounter for screening for infections with a predominantly sexual mode of transmission 10/10/2019    Screen for STD (sexually transmitted disease)    Encounter for screening for other disorder 01/10/2019    Screening for blood or protein in urine    Hematoma of broad ligament     Broad ligament hematoma    Irregular menstruation, unspecified 2016    Irregular menstrual cycle    Other abnormal and inconclusive findings on diagnostic imaging of breast 2019    Abnormal finding on breast imaging    Personal history of other diseases of the female genital tract 2016    History of menorrhagia    Personal history of other diseases of the female genital tract 2014    History of dysmenorrhea    Personal history of other specified conditions 2017    History of pelvic hematoma    Personal history of other specified conditions 2019    History of urinary frequency    Personal history of other specified conditions 2019    History of urinary urgency    Unspecified abdominal pain 2019    Abdominal pain, recurrent   [2]   Past Surgical History:  Procedure Laterality Date     SECTION, CLASSIC  2014     Section    OTHER SURGICAL HISTORY  2016    Supracervical Hysterectomy Laparoscopic   [3] No family history on file.  [4]   Social History  Tobacco Use    Smoking status:  Never    Smokeless tobacco: Never   Substance Use Topics    Alcohol use: Not on file    Drug use: Not on file        Chana Cortes PA-C  08/07/25 1156

## 2025-08-07 NOTE — DISCHARGE INSTRUCTIONS
Follow-up closely with primary care provider in the following week.  Please follow-up closely with orthopedic provider for continued management of Baker's cyst.  Medication given to help with pain.  Continue compression Ace wrap and elevation to help with pain.   Take Eliquis medication as prescribed.  Follow-up closely with vascular provider outpatient for further evaluation of DVT.

## 2025-08-18 PROBLEM — N76.0 BACTERIAL VAGINAL INFECTION: Status: ACTIVE | Noted: 2025-08-18

## 2025-08-18 PROBLEM — R19.5 OCCULT BLOOD IN STOOLS: Status: ACTIVE | Noted: 2025-08-18

## 2025-08-18 PROBLEM — M17.11 PRIMARY OSTEOARTHRITIS OF RIGHT KNEE: Status: ACTIVE | Noted: 2025-08-18

## 2025-08-18 PROBLEM — R35.0 INCREASED FREQUENCY OF URINATION: Status: ACTIVE | Noted: 2022-08-27

## 2025-08-18 PROBLEM — N93.9 ABNORMAL VAGINAL BLEEDING: Status: ACTIVE | Noted: 2025-08-18

## 2025-08-18 PROBLEM — N39.0 FREQUENT UTI: Status: ACTIVE | Noted: 2025-08-18

## 2025-08-18 PROBLEM — R10.2 PELVIC PAIN IN FEMALE: Status: ACTIVE | Noted: 2025-08-18

## 2025-08-18 PROBLEM — K58.9 IRRITABLE COLON: Status: ACTIVE | Noted: 2025-08-18

## 2025-08-18 PROBLEM — R53.83 FATIGUE: Status: ACTIVE | Noted: 2025-08-18

## 2025-08-18 PROBLEM — R45.7 EMOTIONAL STRESS: Status: ACTIVE | Noted: 2025-08-18

## 2025-08-18 PROBLEM — G47.19 EXCESSIVE DAYTIME SLEEPINESS: Status: ACTIVE | Noted: 2020-08-18

## 2025-08-18 PROBLEM — S09.90XA INJURY OF HEAD: Status: ACTIVE | Noted: 2025-08-18

## 2025-08-18 PROBLEM — H01.019 BLEPHARITIS, ULCERATIVE: Status: ACTIVE | Noted: 2025-08-18

## 2025-08-18 PROBLEM — R10.9 ABDOMINAL PAIN: Status: ACTIVE | Noted: 2025-08-18

## 2025-08-18 PROBLEM — M79.89: Status: ACTIVE | Noted: 2020-08-18

## 2025-08-18 PROBLEM — Z97.3 WEARS GLASSES: Status: ACTIVE | Noted: 2025-08-18

## 2025-08-18 PROBLEM — S06.0X0A CONCUSSION WITHOUT LOSS OF CONSCIOUSNESS: Status: ACTIVE | Noted: 2025-08-18

## 2025-08-18 PROBLEM — R51.9 HEADACHE: Status: ACTIVE | Noted: 2025-08-18

## 2025-08-18 PROBLEM — M54.50 LOW BACK PAIN: Status: ACTIVE | Noted: 2020-08-14

## 2025-08-18 PROBLEM — N12 PYELONEPHRITIS: Status: ACTIVE | Noted: 2025-08-18

## 2025-08-18 PROBLEM — H04.123 DRY EYES: Status: ACTIVE | Noted: 2025-08-18

## 2025-08-18 PROBLEM — K92.1 HEMATOCHEZIA: Status: ACTIVE | Noted: 2025-08-18

## 2025-08-18 PROBLEM — B00.9 HERPES SIMPLEX TYPE 2 INFECTION: Status: ACTIVE | Noted: 2025-08-18

## 2025-08-18 PROBLEM — R10.9 FLANK PAIN: Status: ACTIVE | Noted: 2025-08-18

## 2025-08-18 PROBLEM — R50.9 FEVER: Status: ACTIVE | Noted: 2025-08-18

## 2025-08-18 PROBLEM — B96.89 BACTERIAL VAGINAL INFECTION: Status: ACTIVE | Noted: 2025-08-18

## 2025-08-18 PROBLEM — H52.7 REFRACTIVE ERROR: Status: ACTIVE | Noted: 2025-08-18

## 2025-08-18 PROBLEM — D64.9 ANEMIA: Status: ACTIVE | Noted: 2020-08-14

## 2025-08-18 PROBLEM — M67.40 GANGLION CYST: Status: ACTIVE | Noted: 2025-08-18

## 2025-08-18 PROBLEM — H53.8 HAZY VISION: Status: ACTIVE | Noted: 2025-08-18

## 2025-08-18 PROBLEM — F43.22 ADJUSTMENT DISORDER WITH ANXIOUS MOOD: Status: ACTIVE | Noted: 2025-08-18

## 2025-08-18 PROBLEM — R10.9 STOMACH CRAMPS: Status: ACTIVE | Noted: 2025-08-18

## 2025-08-18 PROBLEM — R05.9 COUGH: Status: ACTIVE | Noted: 2025-08-18

## 2025-08-18 PROBLEM — R42 DIZZINESS: Status: ACTIVE | Noted: 2025-08-18

## 2025-08-18 PROBLEM — R10.9 ABDOMINAL DISCOMFORT: Status: ACTIVE | Noted: 2025-08-18

## 2025-08-19 ENCOUNTER — OFFICE VISIT (OUTPATIENT)
Dept: CARDIOLOGY | Facility: CLINIC | Age: 45
End: 2025-08-19
Payer: COMMERCIAL

## 2025-08-19 VITALS
DIASTOLIC BLOOD PRESSURE: 66 MMHG | SYSTOLIC BLOOD PRESSURE: 93 MMHG | BODY MASS INDEX: 39.15 KG/M2 | OXYGEN SATURATION: 95 % | HEART RATE: 89 BPM | WEIGHT: 249.4 LBS | HEIGHT: 67 IN

## 2025-08-19 DIAGNOSIS — Z79.01 ANTICOAGULATION MANAGEMENT ENCOUNTER: ICD-10-CM

## 2025-08-19 DIAGNOSIS — I82.441 ACUTE DEEP VEIN THROMBOSIS (DVT) OF TIBIAL VEIN OF RIGHT LOWER EXTREMITY: Primary | ICD-10-CM

## 2025-08-19 DIAGNOSIS — Z51.81 ANTICOAGULATION MANAGEMENT ENCOUNTER: ICD-10-CM

## 2025-08-19 PROCEDURE — 99202 OFFICE O/P NEW SF 15 MIN: CPT

## 2025-08-19 RX ORDER — OXYCODONE AND ACETAMINOPHEN 5; 325 MG/1; MG/1
1 TABLET ORAL EVERY 8 HOURS PRN
COMMUNITY

## 2025-08-19 ASSESSMENT — COLUMBIA-SUICIDE SEVERITY RATING SCALE - C-SSRS
2. HAVE YOU ACTUALLY HAD ANY THOUGHTS OF KILLING YOURSELF?: NO
1. IN THE PAST MONTH, HAVE YOU WISHED YOU WERE DEAD OR WISHED YOU COULD GO TO SLEEP AND NOT WAKE UP?: NO
6. HAVE YOU EVER DONE ANYTHING, STARTED TO DO ANYTHING, OR PREPARED TO DO ANYTHING TO END YOUR LIFE?: NO

## 2025-08-19 ASSESSMENT — PATIENT HEALTH QUESTIONNAIRE - PHQ9
SUM OF ALL RESPONSES TO PHQ9 QUESTIONS 1 AND 2: 1
2. FEELING DOWN, DEPRESSED OR HOPELESS: SEVERAL DAYS
1. LITTLE INTEREST OR PLEASURE IN DOING THINGS: NOT AT ALL

## 2025-08-19 ASSESSMENT — ENCOUNTER SYMPTOMS
OCCASIONAL FEELINGS OF UNSTEADINESS: 1
DEPRESSION: 0
LOSS OF SENSATION IN FEET: 0

## 2025-08-19 ASSESSMENT — PAIN SCALES - GENERAL: PAINLEVEL_OUTOF10: 7

## 2025-08-24 LAB
B2 GLYCOPROT1 IGA SER-ACNC: <2 U/ML
B2 GLYCOPROT1 IGG SER-ACNC: <2 U/ML
B2 GLYCOPROT1 IGM SER-ACNC: 4.5 U/ML
CARDIOLIPIN IGA SER IA-ACNC: <2 APL-U/ML
CARDIOLIPIN IGG SER IA-ACNC: <2 GPL-U/ML
CARDIOLIPIN IGM SER IA-ACNC: 6.6 MPL-U/ML